# Patient Record
Sex: FEMALE | Race: WHITE
[De-identification: names, ages, dates, MRNs, and addresses within clinical notes are randomized per-mention and may not be internally consistent; named-entity substitution may affect disease eponyms.]

---
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
## 2019-07-29 ENCOUNTER — OFFICE VISIT (OUTPATIENT)
Dept: ALLERGY | Facility: CLINIC | Age: 19
End: 2019-07-29
Payer: COMMERCIAL

## 2019-07-29 ENCOUNTER — PRE VISIT (OUTPATIENT)
Dept: ALLERGY | Facility: CLINIC | Age: 19
End: 2019-07-29

## 2019-07-29 VITALS — OXYGEN SATURATION: 97 % | HEART RATE: 87 BPM | DIASTOLIC BLOOD PRESSURE: 68 MMHG | SYSTOLIC BLOOD PRESSURE: 121 MMHG

## 2019-07-29 DIAGNOSIS — J30.89 ALLERGIC RHINITIS DUE TO AMERICAN HOUSE DUST MITE: ICD-10-CM

## 2019-07-29 DIAGNOSIS — J30.1 SEASONAL ALLERGIC RHINITIS DUE TO POLLEN: Primary | ICD-10-CM

## 2019-07-29 RX ORDER — BUPROPION HYDROCHLORIDE 100 MG/1
300 TABLET ORAL
COMMUNITY

## 2019-07-29 RX ORDER — DEXMETHYLPHENIDATE HYDROCHLORIDE 10 MG/1
10 TABLET ORAL PRN
COMMUNITY

## 2019-07-29 RX ORDER — ALBUTEROL SULFATE 90 UG/1
2 AEROSOL, METERED RESPIRATORY (INHALATION) EVERY 6 HOURS
COMMUNITY

## 2019-07-29 NOTE — PROGRESS NOTES
Reason for Visit  Renuka Dubon is a 19 year old female who is referred by Precious Mohr for cough.    Allergy HPI  Was living in [facility] and mom noted lots of mold in things she was making so thinks mold exposure.  Started coughing and in [place] in the summer and holidays.  Mom noted high does vitamin C shots in [facility] helped the cough.  IN [facility] still coughing.  Has gone to lung specialists and no benefit.  Mom wonders about mold.  Runny nose and PND since Christmas.    Diagnosed with asthma a few years ago and albuterol not helping so then told was VCD and did tests with cameras.  Breathing exercises did not help.  Last year a different lung specialist said had a different asthma and given 2 inhalers (breo and ventolin) and they help but does not alleviate it.  Has not had allergy testing before.  No nasal sprays or allergy pills.    Cough is productive.  No certain time of the day the cough is worse.  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  In [place] cough was fine.      Social:  2 dogs, no smoking  Family:  No allergies known.     The patient was seen and examined by Santiago Car MD   Current Outpatient Medications   Medication     albuterol (PROAIR HFA/PROVENTIL HFA/VENTOLIN HFA) 108 (90 Base) MCG/ACT inhaler     buPROPion (WELLBUTRIN) 100 MG tablet     dexmethylphenidate (FOCALIN) 10 MG tablet     fluticasone-vilanterol (BREO ELLIPTA) 100-25 MCG/INH inhaler     No current facility-administered medications for this visit.      No Known Allergies  Social History     Socioeconomic History     Marital status: Single     Spouse name: Not on file     Number of children: Not on file     Years of education: Not on file     Highest education level: Not on file   Occupational History     Not on file   Social Needs     Financial resource strain: Not on file     Food insecurity:     Worry: Not on file     Inability: Not on file     Transportation needs:     Medical: Not on file     Non-medical: Not on  file   Tobacco Use     Smoking status: Not on file   Substance and Sexual Activity     Alcohol use: Not on file     Drug use: Not on file     Sexual activity: Not on file   Lifestyle     Physical activity:     Days per week: Not on file     Minutes per session: Not on file     Stress: Not on file   Relationships     Social connections:     Talks on phone: Not on file     Gets together: Not on file     Attends Denominational service: Not on file     Active member of club or organization: Not on file     Attends meetings of clubs or organizations: Not on file     Relationship status: Not on file     Intimate partner violence:     Fear of current or ex partner: Not on file     Emotionally abused: Not on file     Physically abused: Not on file     Forced sexual activity: Not on file   Other Topics Concern     Not on file   Social History Narrative     Not on file     No past medical history on file.  No past surgical history on file.  No family history on file.      ROS   A complete ROS was otherwise negative except as noted in the HPI and the end of the note.  /68 (BP Location: Right arm, Patient Position: Sitting, Cuff Size: Adult Regular)   Pulse 87   SpO2 97%   Exam:   GENERAL APPEARANCE: Well developed, well nourished, alert, and in no apparent distress.  EYES: PERRL, EOMI, conjunctiva clear non-injected  HENT: Nasal mucosa with no edema and no discharge. No nasal polyps.  No facial tenderness.  EARS: Canals clear, TMs normal  MOUTH: Oral mucosa is moist, without any lesions, no tonsillar enlargement, no oropharyngeal exudate.  NECK: Supple, no masses, no thyromegaly.  LYMPHATICS: No significant cervical, or supraclavicular nodes.  RESP: Good air flow throughout.  No crackles. No rhonchi. No wheezes.  CV: Normal S1, S2, regular rhythm, normal rate. No murmur.  No rub. No gallop. No LE edema.   MS: Extremities normal. No clubbing. No cyanosis.  SKIN: No rashes noted  NEURO: Speech normal, normal strength and  tone, normal gait and stance  PSYCH: Normal mentation, orientation to person, place, and time.  Results:      Assessment and plan:   10 mg ceterizine (zyrtec) once a day August 1 until the first snow cover or hard frost.  If zyrtec is making you tired change to Allegra 180 mg.  Take fluticasone (flonase) 2 sprays in each nostril once a day.   She is going to [place] for a semester so allergy shots are not ideal, when she is going back to school in [place] this may be a better option.  She grew up in [place] in a [place] section of [facility] and thought mould may be a trigger but these were negative.

## 2019-07-29 NOTE — LETTER
7/29/2019         RE: Renuka Dubon  [address]  [place] [address]        Dear Colleague,    Thank you for referring your patient, Renuka Dubon, to the [facility] ALLERGY. Please see a copy of my visit note below.    Chief Complaint   Patient presents with     Allergy Consult     stuffy/runny nose that comes and goes; cough; Lived in [facility] for 10 years and found out was exposed to mold      Celeste Yang RN      Reason for Visit  Renuka Dubon is a 19 year old female who is referred by Precious Mohr for cough.    Allergy HPI  Was living in [facility] and mom noted lots of mold in things she was making so thinks mold exposure.  Started [place]ing and in [place] in the summer and holidays.  Mom noted high does vitamin C shots in [facility] helped the cough.  IN [facility] still coughing.  Has gone to lung specialists and no benefit.  Mom wonders about mold.  Runny nose and PND since Christmas.    Diagnosed with asthma a few years ago and albuterol not helping so then told was VCD and did tests with cameras.  Breathing exercises did not help.  Last year a different lung specialist said had a different asthma and given 2 inhalers (breo and ventolin) and they help but does not alleviate it.  Has not had allergy testing before.  No nasal sprays or allergy pills.    Cough is productive.  No certain time of the day the cough is worse.  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  In [place] cough was fine.      Social:  2 dogs, no smoking  Family:  No allergies known.     The patient was seen and examined by Santiago Car MD   Current Outpatient Medications   Medication     albuterol (PROAIR HFA/PROVENTIL HFA/VENTOLIN HFA) 108 (90 Base) MCG/ACT inhaler     buPROPion (WELLBUTRIN) 100 MG tablet     dexmethylphenidate (FOCALIN) 10 MG tablet     fluticasone-vilanterol (BREO ELLIPTA) 100-25 MCG/INH inhaler     No current facility-administered medications for this visit.      No Known Allergies  Social History      Socioeconomic History     Marital status: Single     Spouse name: Not on file     Number of children: Not on file     Years of education: Not on file     Highest education level: Not on file   Occupational History     Not on file   Social Needs     Financial resource strain: Not on file     Food insecurity:     Worry: Not on file     Inability: Not on file     Transportation needs:     Medical: Not on file     Non-medical: Not on file   Tobacco Use     Smoking status: Not on file   Substance and Sexual Activity     Alcohol use: Not on file     Drug use: Not on file     Sexual activity: Not on file   Lifestyle     Physical activity:     Days per week: Not on file     Minutes per session: Not on file     Stress: Not on file   Relationships     Social connections:     Talks on phone: Not on file     Gets together: Not on file     Attends Sabianist service: Not on file     Active member of club or organization: Not on file     Attends meetings of clubs or organizations: Not on file     Relationship status: Not on file     Intimate partner violence:     Fear of current or ex partner: Not on file     Emotionally abused: Not on file     Physically abused: Not on file     Forced sexual activity: Not on file   Other Topics Concern     Not on file   Social History Narrative     Not on file     No past medical history on file.  No past surgical history on file.  No family history on file.      ROS   A complete ROS was otherwise negative except as noted in the HPI and the end of the note.  /68 (BP Location: Right arm, Patient Position: Sitting, Cuff Size: Adult Regular)   Pulse 87   SpO2 97%   Exam:   GENERAL APPEARANCE: Well developed, well nourished, alert, and in no apparent distress.  EYES: PERRL, EOMI, conjunctiva clear non-injected  HENT: Nasal mucosa with no edema and no discharge. No nasal polyps.  No facial tenderness.  EARS: Canals clear, TMs normal  MOUTH: Oral mucosa is moist, without any lesions, no  tonsillar enlargement, no oropharyngeal exudate.  NECK: Supple, no masses, no thyromegaly.  LYMPHATICS: No significant cervical, or supraclavicular nodes.  RESP: Good air flow throughout.  No crackles. No rhonchi. No wheezes.  CV: Normal S1, S2, regular rhythm, normal rate. No murmur.  No rub. No gallop. No LE edema.   MS: Extremities normal. No clubbing. No cyanosis.  SKIN: No rashes noted  NEURO: Speech normal, normal strength and tone, normal gait and stance  PSYCH: Normal mentation, orientation to person, place, and time.  Results:      Assessment and plan:   10 mg ceterizine (zyrtec) once a day August 1 until the first snow cover or hard frost.  If zyrtec is making you tired change to Allegra 180 mg.  Take fluticasone (flonase) 2 sprays in each nostril once a day.   She is going to [place] for a semester so allergy shots are not ideal, when she is going back to school in [place] this may be a better option.  She grew [place] in [place] in [place] section of [facility] and thought mould may be a trigger but these were negative.     Again, thank you for allowing me to participate in the care of your patient.        Sincerely,        Santiago Car MD

## 2019-07-29 NOTE — PROGRESS NOTES
Chief Complaint   Patient presents with     Allergy Consult     stuffy/runny nose that comes and goes; cough; Lived in [facility] for 10 years and found out was exposed to mold      Celeste Yang RN

## 2019-07-31 ENCOUNTER — TELEPHONE (OUTPATIENT)
Dept: ALLERGY | Facility: CLINIC | Age: 19
End: 2019-07-31

## 2019-07-31 DIAGNOSIS — J31.0 CHRONIC RHINITIS: Primary | ICD-10-CM

## 2019-07-31 NOTE — TELEPHONE ENCOUNTER
[facility] Call Center    Phone Message    May a detailed message be left on voicemail: yes    Reason for Call: Pt stated Dr. Birch was planning to call in a precription for her for Fluticasone (Flonase) nasal spray to her pharmacy, but they haven't received anything. Nothing listed in medication list that it was called/sent to pharmacy either. Dr. Birch does mention it in visit notes.     Pt would like medication sent to [facility] in [place] off [facility]. Phone: [phone].    Please call Pt back if you have any questions.    Action Taken: Message routed to:  [facility] Allergy Clinic

## 2019-08-01 RX ORDER — FLUTICASONE PROPIONATE 50 MCG
1 SPRAY, SUSPENSION (ML) NASAL DAILY
Qty: 9.9 ML | Refills: 1 | Status: SHIPPED | OUTPATIENT
Start: 2019-08-01

## 2019-08-01 NOTE — TELEPHONE ENCOUNTER
Will place a message to the JUANA to place order for Flonase. Patient wants it sent to [facility]'s in [place] off [address]      Celeste Yang RN

## 2019-08-01 NOTE — TELEPHONE ENCOUNTER
Placed prescription order for Flonase. This will very likely be cheaper to purchase OTC than the insurance copay cost, but patient can try filling the prescription if they would like.     Dara Campa MD  Dermatology Resident PGY4

## 2020-07-13 ENCOUNTER — VIRTUAL VISIT (OUTPATIENT)
Dept: FAMILY MEDICINE | Facility: CLINIC | Age: 20
End: 2020-07-13
Payer: COMMERCIAL

## 2020-07-13 DIAGNOSIS — Z83.49 FAMILY HISTORY OF HASHIMOTO THYROIDITIS: Primary | ICD-10-CM

## 2020-07-13 DIAGNOSIS — R05.3 CHRONIC COUGH: ICD-10-CM

## 2020-07-13 DIAGNOSIS — R53.83 FATIGUE, UNSPECIFIED TYPE: ICD-10-CM

## 2020-07-13 DIAGNOSIS — R13.10 DYSPHAGIA, UNSPECIFIED TYPE: ICD-10-CM

## 2020-07-13 RX ORDER — ETONOGESTREL/ETHINYL ESTRADIOL .12-.015MG
RING, VAGINAL VAGINAL
COMMUNITY
Start: 2020-05-19

## 2020-07-13 ASSESSMENT — PATIENT HEALTH QUESTIONNAIRE - PHQ9: SUM OF ALL RESPONSES TO PHQ QUESTIONS 1-9: 11

## 2020-07-13 NOTE — PROGRESS NOTES
"Renuka Dubon is a 20 year old female who is being evaluated via a billable video visit.      The patient has been notified of following:     \"This video visit will be conducted via a call between you and your physician/provider. We have found that certain health care needs can be provided without the need for an in-person physical exam.  This service lets us provide the care you need with a video conversation.  If a prescription is necessary we can send it directly to your pharmacy.  If lab work is needed we can place an order for that and you can then stop by our lab to have the test done at a later time.    Video visits are billed at different rates depending on your insurance coverage.  Please reach out to your insurance provider with any questions.    If during the course of the call the physician/provider feels a video visit is not appropriate, you will not be charged for this service.\"    Patient has given verbal consent for Video visit? Yes  How would you like to obtain your AVS? Adeola  Patient would like the video invitation sent by: Adeola  Will anyone else be joining your video visit? No    Subjective     Renuka Dubno is a 20 year old female who presents today via video visit for the following health issues:    Renuka states that she has multiple chronic health issues and she is not concerned that they all may be associated with her thyroid.    Renuka's mother has Hashimotos thyroiditis and subsequently hypothyroid and Renuka feels that many symptoms she is experiencing are similar, she said \"10/11 on the checklist I saw.\"  Her main symptoms are dry skin, depression for years and treated with medications and she does do therapy on weekly basis while at school, she feels that she is sleeping a lot, she is experiencing daily joint pain and stiffness, she has had a chronic cough for years, she experiences some difficulty swallowing.  When she palpates her neck where the thyroid is located she states she feels a little " pain.  She states she has at least 2 strep throat infections/year, she says that it is hep C that grows out on cultures.      Renuka has been extensively worked up for the cough.  She has been to pulmonary specialists, to allergists.  She has used Breo and an albuterol inhaler and the Breo was not at all helpful.  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 Her cough is all day, and she does produce mucus with the cough.  She has been told this is associated with post nasal drip, she is not convinced about this theory.      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  She has spent a good amount of time out of the country for her education, she was in [place] in March when she had to return due to Covid-19. She is currently home in [place] due to CV-19, she feels that she is doing pretty well here.  She takes medication for her depression, she also sees a therapist in [place] once/week, she has access to this person now and emails her, but not every week.  She is not suicidal.      Video Start Time:  0836    Review of Systems     Review Of Systems  Skin: as above  Eyes: negative  Ears/Nose/Throat: as above, postnasal drainage, hoarseness  Respiratory: Cough- as described above  Cardiovascular: negative  Gastrointestinal: negative  Genitourinary: sexually active, uses the Nuva Ring for contraception, she likes it and is followed by [facility]  Musculoskeletal: joint pain and joint stiffness  Neurologic: negative  Psychiatric: depression, symptoms and treatment as noted above  Hematologic/Lymphatic/Immunologic: negative  Endocrine: as above    Objective       Physical Exam     GENERAL: Healthy, alert and no distress  EYES: Eyes grossly normal to inspection.  No discharge or erythema, or obvious scleral/conjunctival abnormalities.  RESP:  No audible wheeze, cough, or visible cyanosis.  No visible retractions or increased work of breathing.    SKIN: Visible skin clear. No significant rash, abnormal pigmentation or lesions.  NEURO: Visible cranial nerves grossly intact.  Mentation and speech appropriate for age.  PSYCH: Mentation appears normal, affect normal/bright, judgement and insight intact, normal speech and appearance well-groomed.      Assessment & Plan     1. Family history of Hashimoto thyroiditis    - TSH with free T4 reflex; Future  - Thyroid peroxidase antibody; Future  - Thyroglobulin and Antibody Reflex; Future    2. Fatigue, unspecified type    - TSH with free T4 reflex; Future  - Thyroid peroxidase antibody; Future  - Thyroglobulin and Antibody Reflex; Future    3. Chronic cough    - TSH with free T4 reflex; Future  - Thyroid peroxidase antibody; Future  - Thyroglobulin and Antibody Reflex; Future    4. Dysphagia, unspecified type    - TSH with free T4 reflex; Future  - Thyroid peroxidase antibody; Future  - Thyroglobulin and Antibody Reflex; Future     Renuka and I made a plan to do labs at the [facility] first before a referral to an endocrinologist.  Based on the outcome, we will consider a referral or a discussion of further evaluation through us.  I told Renuka that at some point I would like to see her in-person, perhaps we could do a PE so I could also listen to her lungs.  She is comfortable with that as well and we will discuss this when we go over her labs.    Mary Beth Velarde NP  [facility]      Video-Visit Details    Type of service:  Video Visit    Video End Time:0855    Originating Location (pt. Location): Home    Distant Location (provider location):  [facility]     Platform used for Video Visit: i[employer]    No follow-ups on file.       Mary Beth Velarde NP

## 2020-07-24 DIAGNOSIS — Z83.49 FAMILY HISTORY OF HASHIMOTO THYROIDITIS: ICD-10-CM

## 2020-07-24 DIAGNOSIS — R13.10 DYSPHAGIA, UNSPECIFIED TYPE: ICD-10-CM

## 2020-07-24 DIAGNOSIS — R05.3 CHRONIC COUGH: ICD-10-CM

## 2020-07-24 DIAGNOSIS — R53.83 FATIGUE, UNSPECIFIED TYPE: ICD-10-CM

## 2020-07-24 LAB — TSH SERPL DL<=0.005 MIU/L-ACNC: 1.77 MU/L (ref 0.4–4)

## 2020-07-28 LAB
THYROGLOB AB SERPL IA-ACNC: <20 IU/ML (ref 0–40)
THYROPEROXIDASE AB SERPL-ACNC: <10 IU/ML

## 2020-07-29 LAB — THYROGLOB SERPL-MCNC: 48.2 NG/ML

## 2020-08-12 NOTE — PATIENT INSTRUCTIONS
10 mg ceterizine (zyrtec) once a day August 1 until the first snow cover or hard frost.  If zyrtec is making you tired change to Allegra 180 mg.  Take fluticasone (flonase) 2 sprays in each nostril once a day.     DUST MITE ALLERGY  Dust mites are microscopic bugs that live in dust, feeding on dead skin from our bodies. Dust mites flourish in warm, humid environments and therefore are highest  in number in carpeting, pillows and mattresses.     Tips:    Encase pillows, mattresses, and box springs in zippered allergy proof covers.    Wash all bed linens in at least 1300 F every week.    Remove stuffed animals or freeze them every other week.     Remove upholstered furniture from the bedroom and consider removing the carpet.     Keep ceiling fans off in the bedroom as they can stir up dust mite allergens.    Frequently dust and vacuum the house, especially the bedroom.    Acaracides (chemicals which kill mites) are available for use in the home. These acaracides require repeated applications to remain effective and may irritate asthmatics. It is still unclear how much, if any clinical benefit is gained by the use of acaracides. Therefore these agents are usually not recommended for initial mite control.        *All information has been reviewed, updated and approved by:  Dr. [facility] updated: 11/2016    DUST MITE ALLERGY  Dust mites are microscopic bugs that live in dust, feeding on dead skin from our bodies. Dust mites flourish in warm, humid environments and therefore are highest  in number in carpeting, pillows and mattresses.     Tips:    Encase pillows, mattresses, and box springs in zippered allergy proof covers.    Wash all bed linens in at least 1300 F every week.    Remove stuffed animals or freeze them every other week.     Remove upholstered furniture from the bedroom and consider removing the carpet.     Keep ceiling fans off in the bedroom as they can  stir up dust mite allergens.    Frequently dust and vacuum the house, especially the bedroom.    Acaracides (chemicals which kill mites) are available for use in the home. These acaracides require repeated applications to remain effective and may irritate asthmatics. It is still unclear how much, if any clinical benefit is gained by the use of acaracides. Therefore these agents are usually not recommended for initial mite control.        *All information has been reviewed, updated and approved by:  Dr. Santiago [facility] updated: 11/2016      IMMUNOTHERAPY (ALLERGY SHOTS)  General Information    What are allergy shots and what can they do for me?  --Allergy shots (otherwise known as immunotherapy or desensitization) help make your immune system less sensitive to the things that cause your allergy symptoms.    Should I have allergy shots?  --MAYBE--If you are allergic to things that are unavoidable such as trees, grasses, weeds, molds, dust or animals.  --MAYBE--If you have to take medicine(s) more often than not to control your allergies; OR despite your medicines, you are still having allergy symptoms.  --MAYBE--If your allergies are interfering with daily activities.    What can allergy shots do for me?  --Eventually, you may no longer need your allergy medicine. You should have fewer or milder allergy symptoms. You may no longer have allergy symptoms. You may not have to visit your health care provider as often.     Can allergy shots really help?  --The success rate for allergy shots is high ~75-85%. Many people consider themselves  cured , but some will suffer a recurrence. Some people will require shots for longer than 5 years, but most people who complete the allergy shot program do not need to take shots again. Typically immunotherapy is complete around 3 years, but it depends on how well the doseages are tolerated, but the doctor will decide.    How do allergy shots  work?  --Allergy shots result in your body making  blocking IgG  antibodies to your  allergy IgE  antibodies. This makes you react less to the things that cause your allergy symptoms. If your allergies are  blocked , you do not release histamines or other allergy mediators that cause your allergy symptoms.     How long before allergy shots start to work?  --Allergy shots may start to work in four to six months. For some people it may take a year. If there is no improvement after two years, discuss this with your doctor or nurse practitioner. The typical maximum benefit from allergy shots usually occurs within the first 1-2 years after reaching and adequate maintenance dose.     Can you still take your other medications?  --The goal of the allergy shots is to allow you to feel as good as possible, with as little medication as possible, and with as few shots as possible. You may still need to take your medications until the allergy shots begin to work.     How much do allergy shots cost?  --Allergy shots may be somewhat costly the first year when more frequent allergy injections are needed. The vial of allergy extract is a separate cost from the actual injection. Your insurance is billed when the allergy vial of extract is made and the injection cost is billed when the shot is administered. Coverage of the cost by insurance plans and HMO varies. In succeeding years, when the injections are spread out to 2-8 weeks, the cost dramatically decreases. Allergy shots may save you money, as you will not require as much medicine, and as you will decrease the number of visits to your health care provider.    Are allergy shots painful?  --The needle used to give shots is very small and thin. The shot is given in the fatty part of the arm, not into the muscle. Most people say that when the allergy shot is given that it does not hurt.     Are allergy shots risky?  --Allergic reactions are rare. Reactions can occur because you are  "receiving small amounts of what you are allergic to. The most serious reactions occur within 30 minutes of the injection.       LOCAL REACTIONS:  Some swelling and/or redness may occur in the first twenty minutes. If there is no discomfort, this may be ignored. If there is a local reaction (the size of a twenty-five cent piece), or a hive which lasts longer than 24 hours, please notifiy us before receiving the next injection. Occasionally, you may develop a delayed local reaction between 4-48 hours. These reactions require no treatment, but may be counteracted by using a cool compress and by taking a dose of an antihistamine, plus Tylenol (Acetaminophen) for pain.  SYSTEMIC REACTIONS: This type of reaction is more serious. It can consist of symptoms of throat tightening, difficulty breathing, fainting, vomiting, or hives, amongst others. If you are still in the office/clinic, immediately notify a health care provider. If you have left the office/clinic, seek immediate health care assistance. Systemic reactions require immediate treatment and notification of our office. Treatment of systemic reactions requires the administration of adrenaline (epinephrine) and evaluation by a physician. Please call our office at [phone] to speak to a nurse during business hours. If after hours please go to the ER.      **EVERYONE MUST WAIT 30-MINUTES AFTER EACH ALLERGY SHOT IN CASE OF A REACTION!!**                        *All information has been reviewed, updated and approved by:  Dr. Santiago [facility] updated: 1/2017         Pollen Control Measures      * Keep windows and doors shut and run air conditioner at all times. This keeps outdoor air outside.    * Keep windows closed while in the car and air conditioner on the \"re-circulate\" mode.    * Wash your hair before going to bed at night to reduce exposure during sleep.    * Keep pets outdoors to prevent the pollen from being brought " in on their hair.    * Avoid hanging clothes and linens outside as pollens may collect on the items.     * Don't mow the lawn if you are allergic to grass or weeds. If you must mow the grass, wear a face mask.       Spring: Tree Pollen    Summer: Grass Pollen and Mold    Fall: Weed Pollen and Mold      *All information has been reviewed, updated and approved by:  Dr. [facility] updated: 11/2016     AOX4, With sob on exertion and at rest. vss

## 2020-12-27 ENCOUNTER — HEALTH MAINTENANCE LETTER (OUTPATIENT)
Age: 20
End: 2020-12-27

## 2021-10-09 ENCOUNTER — HEALTH MAINTENANCE LETTER (OUTPATIENT)
Age: 21
End: 2021-10-09

## 2022-01-23 ENCOUNTER — HEALTH MAINTENANCE LETTER (OUTPATIENT)
Age: 22
End: 2022-01-23

## 2022-09-11 ENCOUNTER — HEALTH MAINTENANCE LETTER (OUTPATIENT)
Age: 22
End: 2022-09-11

## 2023-04-30 ENCOUNTER — HEALTH MAINTENANCE LETTER (OUTPATIENT)
Age: 23
End: 2023-04-30